# Patient Record
Sex: MALE | Race: WHITE | NOT HISPANIC OR LATINO | Employment: FULL TIME | URBAN - METROPOLITAN AREA
[De-identification: names, ages, dates, MRNs, and addresses within clinical notes are randomized per-mention and may not be internally consistent; named-entity substitution may affect disease eponyms.]

---

## 2023-10-03 PROBLEM — M54.16 LEFT LUMBAR RADICULITIS: Status: ACTIVE | Noted: 2023-10-03

## 2023-10-03 PROBLEM — M25.512 CHRONIC LEFT SHOULDER PAIN: Status: ACTIVE | Noted: 2023-10-03

## 2023-10-03 PROBLEM — R79.89 LOW TESTOSTERONE IN MALE: Status: ACTIVE | Noted: 2023-10-03

## 2023-10-03 PROBLEM — Z96.641 STATUS POST TOTAL REPLACEMENT OF RIGHT HIP: Status: ACTIVE | Noted: 2023-10-03

## 2023-10-03 PROBLEM — D22.9: Status: ACTIVE | Noted: 2023-10-03

## 2023-10-03 PROBLEM — G89.29 CHRONIC LEFT SHOULDER PAIN: Status: ACTIVE | Noted: 2023-10-03

## 2023-10-03 RX ORDER — CHOLECALCIFEROL (VITAMIN D3) 125 MCG
125 CAPSULE ORAL
COMMUNITY
End: 2024-02-20 | Stop reason: WASHOUT

## 2023-10-05 ENCOUNTER — OFFICE VISIT (OUTPATIENT)
Dept: PAIN MEDICINE | Facility: HOSPITAL | Age: 38
End: 2023-10-05
Payer: COMMERCIAL

## 2023-10-05 VITALS — HEIGHT: 78 IN | BODY MASS INDEX: 25.11 KG/M2 | WEIGHT: 217 LBS

## 2023-10-05 DIAGNOSIS — M54.16 LUMBAR RADICULITIS: Primary | ICD-10-CM

## 2023-10-05 PROCEDURE — 99204 OFFICE O/P NEW MOD 45 MIN: CPT | Performed by: ANESTHESIOLOGY

## 2023-10-05 PROCEDURE — 99214 OFFICE O/P EST MOD 30 MIN: CPT | Performed by: ANESTHESIOLOGY

## 2023-10-05 RX ORDER — GABAPENTIN 300 MG/1
CAPSULE ORAL
Qty: 180 CAPSULE | Refills: 0 | Status: SHIPPED | OUTPATIENT
Start: 2023-10-05 | End: 2024-02-20 | Stop reason: WASHOUT

## 2023-10-05 ASSESSMENT — PAIN SCALES - GENERAL: PAINLEVEL: 10-WORST PAIN EVER

## 2023-10-05 NOTE — H&P
History Of Present Illness  Diaz Velasco is a 38 y.o. male who presents as a new patient in our clinic today. Patient has a history of avascular necrosis and required right hip arthroplasty in 2020. Patient presents complaining of no pain but significant numbness and tingling in the posterior aspect of his buttock going all the way down to his feet. He exercises avidly and notes he felt the symptoms after squatting heavy. He says the symptoms are exacerbated with prolonged standing walking and is relieved with sitting. He denies any bowel/bladder incontinence or saddle anesthesia. Patient does not take any neuromodulating medications. He underwent an x-ray of the lumbar spine earlier in September which did not show any significant findings; no fractures or spondylolisthesis, or degenerative disc changes. Patient has completed a full course of formal physical therapy and continues the stretches and exercises at home 3-4 times a week over the past 1 year.     Past Medical History  He has a past medical history of Idiopathic aseptic necrosis of right femur (CMS/[facility]) (09/23/2020) and Personal history of other mental and behavioral disorders.    Surgical History  He has a past surgical history that includes Other surgical history (08/17/2016).     Social History  He has no history on file for tobacco use, alcohol use, and drug use.    Family History  Family History   Problem Relation Name Age of Onset    Skin cancer Mother      Diabetes Father      Other (Bladder Cancer) Father      Multiple sclerosis Brother          Allergies  Patient has no known allergies.    Review of Systems   All other systems reviewed and are negative.  13 point review of systems is completed and is negative unless stated above in HPI.     Physical Exam  PHYSICAL EXAM  Vitals signs reviewed  Constitutional:       General: Not in acute distress     Appearance: Normal appearance. Not ill-appearing.  HENT:     Head: Normocephalic and  "atraumatic  Eyes:     Conjunctiva/sclera: Conjunctivae normal  Cardiovascular:     Rate and Rhythm: Normal rate and regular rhythm  Pulmonary:     Effort: No respiratory distress  Abdominal:     Palpations: Abdomen is soft  Musculoskeletal: KAMINSKI. Ambulates normally on heels and toes. Straight leg test positive on the right side.  Skin:     General: Skin is warm and dry  Neurological:     General: No focal deficit present  Psychiatric:         Mood and Affect: Mood normal         Behavior: Behavior normal    Last Recorded Vitals  Height 1.981 m (6' 6\"), weight 98.4 kg (217 lb).    Relevant Results  XR lumbar spine complete 4+ views    Result Date: 9/19/2023  Interpreted By:  FERMIN VELA MD MRN: [id number] Patient Name: NEVAEH VELASCO  STUDY: Lumbar Spine, 4 views.  INDICATION: pain  M54.16: Left lumbar radiculitis.  COMPARISON: None.  ACCESSION NUMBER(S): [id number]  ORDERING CLINICIAN: DEXTER CASH  FINDINGS: Alignment is within normal limits. Disc heights are well preserved. Mild facet joint arthropathy at L5-S1. No dynamic instability on flexion/extension views. Vertebral body heights are preserved. Posterior elements are intact.      1. Mild facet joint arthropathy at L5-S1. Otherwise, unremarkable lumbar spine radiographs.              Assessment/Plan   Nevaeh Velasco is a 38-year-old male presenting as new patient in clinic today. Patient has a history of avascular necrosis of the right hip status post right hip replacement in 2020. Patient comes in complaining of numbness and tingling along the right buttock and posterior right lower extremity down to his toes. He attributes this to heavy lifting as he is also a . He says the symptoms are worse with prolonged standing and walking, and is relieved with sitting. He does not take any neurologic medications. He has completed a course of physical therapy and continues to stretches/exercises at home. X-ray of the lumbar spine done in September shows " mild degenerative disc changes at L5-S1, otherwise no significant findings. We discussed with the patient that he is most likely suffering from lumbar spinal stenosis. The mainstay of treatment is a combination of physical therapy, medications, as well as injections. We discussed all of these remedies with the patient and he is wishing to proceed.    Plan:  1. L5-S1 interlaminar epidural injection under fluoroscopy. Risks and benefits of the procedure was discussed with the patient and he wishes to proceed.  2. Gabapentin up titration from 300 mg nightly to be increased 600mg three times daily.  Goals of therapy, the dosages and side effects of the medication were discussed in detail with the patient.  The patient understands and agrees to take the medication as prescribed.  We discussed that if we discontinue this in the future, it would need to be weaned down rather than stopped abruptly.  Instructions and a hardcopy form were given to the patient and explained.  3. We discussed continuing physical therapy to help manage the patient's painful condition.  The patient was counseled that core muscle strengthening will improve the long term prognosis in regards to pain and may also increase range of motion and mobility.  The patient's questions were answered and she was agreeable to this course.                 Rome Nascimento MD

## 2023-10-17 DIAGNOSIS — M54.16 LUMBAR RADICULOPATHY: ICD-10-CM

## 2023-10-23 ENCOUNTER — HOSPITAL ENCOUNTER (OUTPATIENT)
Dept: RADIOLOGY | Facility: HOSPITAL | Age: 38
Discharge: HOME | End: 2023-10-23
Payer: COMMERCIAL

## 2023-10-23 VITALS
SYSTOLIC BLOOD PRESSURE: 153 MMHG | TEMPERATURE: 98.2 F | BODY MASS INDEX: 25.45 KG/M2 | DIASTOLIC BLOOD PRESSURE: 74 MMHG | RESPIRATION RATE: 16 BRPM | WEIGHT: 220 LBS | HEART RATE: 84 BPM | OXYGEN SATURATION: 96 % | HEIGHT: 78 IN

## 2023-10-23 DIAGNOSIS — M54.16 LUMBAR RADICULITIS: ICD-10-CM

## 2023-10-23 DIAGNOSIS — M54.16 LUMBAR RADICULOPATHY: ICD-10-CM

## 2023-10-23 PROCEDURE — 62323 NJX INTERLAMINAR LMBR/SAC: CPT | Performed by: ANESTHESIOLOGY

## 2023-10-23 PROCEDURE — 77003 FLUOROGUIDE FOR SPINE INJECT: CPT

## 2023-10-23 RX ORDER — DEXAMETHASONE SODIUM PHOSPHATE 4 MG/ML
10 INJECTION, SOLUTION INTRA-ARTICULAR; INTRALESIONAL; INTRAMUSCULAR; INTRAVENOUS; SOFT TISSUE AS NEEDED
Status: DISCONTINUED | OUTPATIENT
Start: 2023-10-23 | End: 2023-10-23

## 2023-10-23 RX ORDER — LIDOCAINE HYDROCHLORIDE 5 MG/ML
10 INJECTION, SOLUTION INFILTRATION; PERINEURAL ONCE
Status: DISCONTINUED | OUTPATIENT
Start: 2023-10-23 | End: 2023-10-24 | Stop reason: HOSPADM

## 2023-10-23 RX ORDER — MIDAZOLAM HYDROCHLORIDE 1 MG/ML
2 INJECTION INTRAMUSCULAR; INTRAVENOUS AS NEEDED
Status: DISCONTINUED | OUTPATIENT
Start: 2023-10-23 | End: 2023-10-24 | Stop reason: HOSPADM

## 2023-10-23 RX ORDER — FENTANYL CITRATE 50 UG/ML
50 INJECTION, SOLUTION INTRAMUSCULAR; INTRAVENOUS ONCE
Status: DISCONTINUED | OUTPATIENT
Start: 2023-10-23 | End: 2023-10-24 | Stop reason: HOSPADM

## 2023-10-23 RX ORDER — DEXAMETHASONE SODIUM PHOSPHATE 10 MG/ML
10 INJECTION INTRAMUSCULAR; INTRAVENOUS ONCE AS NEEDED
Status: DISCONTINUED | OUTPATIENT
Start: 2023-10-23 | End: 2023-10-24 | Stop reason: HOSPADM

## 2023-10-23 RX ORDER — ROPIVACAINE HYDROCHLORIDE 5 MG/ML
10 INJECTION, SOLUTION EPIDURAL; INFILTRATION; PERINEURAL ONCE
Status: DISCONTINUED | OUTPATIENT
Start: 2023-10-23 | End: 2023-10-24 | Stop reason: HOSPADM

## 2023-10-23 RX ORDER — MIDAZOLAM HYDROCHLORIDE 1 MG/ML
2 INJECTION INTRAMUSCULAR; INTRAVENOUS ONCE
Status: DISCONTINUED | OUTPATIENT
Start: 2023-10-23 | End: 2023-10-24 | Stop reason: HOSPADM

## 2023-10-23 RX ORDER — FENTANYL CITRATE 50 UG/ML
25 INJECTION, SOLUTION INTRAMUSCULAR; INTRAVENOUS ONCE
Status: DISCONTINUED | OUTPATIENT
Start: 2023-10-23 | End: 2023-10-23

## 2023-10-23 RX ORDER — BUPIVACAINE HYDROCHLORIDE 2.5 MG/ML
10 INJECTION, SOLUTION INFILTRATION; PERINEURAL ONCE
Status: DISCONTINUED | OUTPATIENT
Start: 2023-10-23 | End: 2023-10-24 | Stop reason: HOSPADM

## 2023-10-23 RX ORDER — LIDOCAINE HYDROCHLORIDE 20 MG/ML
10 INJECTION, SOLUTION EPIDURAL; INFILTRATION; INTRACAUDAL; PERINEURAL AS NEEDED
Status: DISCONTINUED | OUTPATIENT
Start: 2023-10-23 | End: 2023-10-24 | Stop reason: HOSPADM

## 2023-10-23 ASSESSMENT — PAIN SCALES - GENERAL
PAINLEVEL_OUTOF10: 5 - MODERATE PAIN
PAINLEVEL_OUTOF10: 0 - NO PAIN
PAINLEVEL_OUTOF10: 0 - NO PAIN

## 2023-10-23 ASSESSMENT — PAIN - FUNCTIONAL ASSESSMENT
PAIN_FUNCTIONAL_ASSESSMENT: 0-10
PAIN_FUNCTIONAL_ASSESSMENT: WONG-BAKER FACES
PAIN_FUNCTIONAL_ASSESSMENT: 0-10

## 2023-10-23 NOTE — H&P
History Of Present Illness  Diaz Velasco is a 38 y.o. male presents for procedure state below. Endorses no changes in past medical history or medical health since last seen in clinic.     Past Medical History  He has a past medical history of Idiopathic aseptic necrosis of right femur (CMS/HCC) (09/23/2020) and Personal history of other mental and behavioral disorders.    Surgical History  He has a past surgical history that includes Other surgical history (08/17/2016).     Social History  He has no history on file for tobacco use, alcohol use, and drug use.    Family History  Family History   Problem Relation Name Age of Onset    Skin cancer Mother      Diabetes Father      Other (Bladder Cancer) Father      Multiple sclerosis Brother          Allergies  Patient has no known allergies.    Review of Symptoms:   Constitutional: Negative for chills, diaphoresis or fever  HENT: Negative for neck swelling  Eyes:.  Negative for eye pain  Respiratory:.  Negative for cough, shortness of breath or wheezing    Cardiovascular:.  Negative for chest pain or palpitations  Gastrointestinal:.  Negative for abdominal pain, nausea and vomiting  Genitourinary:.  Negative for urgency  Musculoskeletal:  Positive for back pain. Positive for joint pain. Denies falls within the past 3 months.  Skin: Negative for wounds or itching   Neurological: Negative for dizziness, seizures, loss of consciousness and weakness  Endo/Heme/Allergies: Does not bruise/bleed easily  Psychiatric/Behavioral: Negative for depression. The patient does not appear anxious.       PHYSICAL EXAM  Vitals signs reviewed  Constitutional:       General: Not in acute distress     Appearance: Normal appearance. Not ill-appearing.  HENT:     Head: Normocephalic and atraumatic  Eyes:     Conjunctiva/sclera: Conjunctivae normal  Cardiovascular:     Rate and Rhythm: Normal rate and regular rhythm  Pulmonary:     Effort: No respiratory distress  Abdominal:     Palpations:  Abdomen is soft  Musculoskeletal: KAMINSKI  Skin:     General: Skin is warm and dry  Neurological:     General: No focal deficit present  Psychiatric:         Mood and Affect: Mood normal         Behavior: Behavior normal     Last Recorded Vitals  There were no vitals taken for this visit.    Relevant Results  Current Outpatient Medications   Medication Instructions    cholecalciferol (VITAMIN D-3) 125 mcg, oral    gabapentin (Neurontin) 300 mg capsule Day 1 Take 300mg at bedtime, Day 2 300mg BID, Day 3 300mg TID, Day 4-6 300mg AM 300mg afternoon 600mg at bedtime, Day 7-9 300mg Am, 600mg afternoon, 600mg at bedtime, Day 10 and after 600mg TID.    zinc sulfate 25 mg zinc (110 mg) tablet oral       No results found for this or any previous visit from the past 1000 days.     No image results found.       1. Lumbar radiculitis  Epidural Steroid Injection    Epidural Steroid Injection      2. Lumbar radiculopathy  FL pain management TC    FL pain management TC           ASSESSMENT/PLAN  Diaz Velasco is a 38 y.o. male presents for L5-S1 Interlaminar Epidural Steroid Injection    Our plan is as follows:  - Follow In pain clinic  - Continue to participate in physical therapy as well as physician directed home exercises  - Continue pain medications as prescribed       Yefri Patrick MD

## 2023-10-23 NOTE — PROCEDURES
Preoperative diagnosis:  Lumbar radiculitis  Postoperative diagnosis:  Lumbar radiculitis  Procedure: Left biased L5-S1 lumbar epidural steroid injection under fluoroscopic guidance  Surgeon: Wendy Graham  Assistant:  Fellow, Yefri Patrick  Anesthesia: Local  Complications: Apparently none    Clinical note: Mr. Velasco is a 38-year-old male with a history of low back and left leg pain and known disc base narrowing and degenerative changes at L5-S1.  The patient is here today for the aforementioned procedure.    Procedure note: The patient was met in the preoperative holding area after risks benefits and alternatives to procedure were discussed with the patient, informed consent was obtained. Patient brought back to the procedure room and placed in the prone position on the fluoroscopy table. Area over the back was exposed, prepped, draped, in the usual sterile fashion.  Skin and subcutaneous tissues to the lumbar intralaminar space was anesthetized using 0.5% lidocaine.  An 18-gauge Tuohy needle was inserted in the skin and advanced into the interlaminar space. A glass syringe was used to achieve the epidural space using the loss resistance technique. Contrast was injected which showed appropriate epidural spread, no intravascular or intrathecal uptake. A total of 4 mL's of 0.5% lidocaine mixed with 40 mg methylprednisolone was injected. Needle removed, bandage applied, patient tolerated the procedure well with no immediate complications.

## 2023-11-29 ENCOUNTER — EVALUATION (OUTPATIENT)
Dept: PHYSICAL THERAPY | Facility: CLINIC | Age: 38
End: 2023-11-29
Payer: COMMERCIAL

## 2023-11-29 DIAGNOSIS — M54.16 LUMBAR RADICULITIS: Primary | ICD-10-CM

## 2023-11-29 PROCEDURE — 97110 THERAPEUTIC EXERCISES: CPT | Mod: GP | Performed by: PHYSICAL THERAPIST

## 2023-11-29 PROCEDURE — 97161 PT EVAL LOW COMPLEX 20 MIN: CPT | Mod: GP | Performed by: PHYSICAL THERAPIST

## 2023-11-29 ASSESSMENT — ENCOUNTER SYMPTOMS
DEPRESSION: 0
OCCASIONAL FEELINGS OF UNSTEADINESS: 0
LOSS OF SENSATION IN FEET: 0

## 2023-11-29 NOTE — PROGRESS NOTES
Physical Therapy Evaluation    Patient Name: Diaz Vleasco  MRN: [id number]  Today's Date: 11/29/2023  Visit: 1/MN  Referred by: Dr. Graham  Diagnosis:   1. Lumbar radiculitis          PRECAUTIONS:   (R) THA 2020    SUBJECTIVE:  Patient is a well-known patient to this clinic as he has been treated   Took a bike ride in August with child and had severe (L) leg pain. Leg pain has improved but (L) foot numbness still persists. 6-8 weeks of pain prior to resolution.  Felt like I was standing on a ball but this has improved with the stretching.  Had injection by pain management 10/23/2023 with minor improvement.  Pain:  0/10 pain, 9/10 numbness LLE.  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   Prior level of function:  Prior history of lumbosacral radiculopathy in the LLE - resolved with therapy    OBJECTIVE:  Lumbar AROM: (% movement)   Flexion 100%   Extension 100%   Right Sidebend 100%   Left Sidebend 100%   Right Rotation 100%   Left Rotation 100%     Lumbar Repetitive Movement Response   Flexion in standing Mild pain low back, worse numbness LLE   Extension in standing No change in numbness, No LBP   Right Sideglide No change in numbness, No LBP   Extension in prone No change in numbness, No LBP, mild improvement in numbness with Pas to L5/S1     Lumbar Sustained Movement Response   Extension in standing Mild improvement in LLE numbness   Extension in prone Mild improvement in numbness.     Core Strength: Sahrmann 4+ MMT  Palpation: No pain to palpation. LLE decreased sensation to light touch in L5/S1 distribution, specifically at lateral foot/ankle.  Special Testing: +Slump LLE and +SLR LLE  Dermatomal Impairment: decreased sensation to light touch in L5/S1 distribution, specifically at lateral foot/ankle.  Myotomal Impairment:   PF: RLE: 5/5, LLE 4+/5   Ankle inversion: RLE: 5/5, LLE 4+/5    ASSESSMENT:  Patient is a 38 year old male who presents to physical therapy on this date for  evaluation and treatment of his LLE radicular symptoms. Examination on this date suggests discal etiology with favorable symptom response with extension protocol. Skilled therapy will target utilization of Santino principles to resolve pain and allow for return to prior level of function. Overpressure during extension will be necessary to address symptoms.    TREATMENT:  - Therex:  Prone press ups 3x10 - PA to L5  Standing lumbar extensions with belt providing PA to L5 3x10  Prone prop with PA to L5  Standing sideglide hips to the R 3x10    PATIENT EDUCATION:  Outpatient Education  Individual(s) Educated: Patient  Education Provided: Anatomy, Home Exercise Program, POC, Physiology  Patient/Caregiver Demonstrated Understanding: yes  Plan of Care Discussed and Agreed Upon: yes    PLAN:   Treatment/Interventions: Manual therapy, Mechanical traction, Therapeutic exercises  PT Plan: Skilled PT  PT Frequency: 1 time per week  Duration: 6 weeks  Rehab Potential: Fair  Plan of Care Agreement: Patient    GOALS:  Active       PT Problem       Patient will report full return of sensation to the LLE.       Start:  11/29/23    Expected End:  01/24/24            Patient will achieve left ankle plantar flexion strength of 5/5       Start:  11/29/23    Expected End:  01/24/24            Patient will achieve left ankle eversion strength of 5/5       Start:  11/29/23    Expected End:  01/24/24            Patient will demonstrate independence in home program for support of progression       Start:  11/29/23    Expected End:  01/24/24

## 2023-12-06 ENCOUNTER — TREATMENT (OUTPATIENT)
Dept: PHYSICAL THERAPY | Facility: CLINIC | Age: 38
End: 2023-12-06
Payer: COMMERCIAL

## 2023-12-06 DIAGNOSIS — M54.16 LUMBAR RADICULITIS: ICD-10-CM

## 2023-12-06 PROCEDURE — 97110 THERAPEUTIC EXERCISES: CPT | Mod: GP | Performed by: PHYSICAL THERAPIST

## 2023-12-06 NOTE — PROGRESS NOTES
"Physical Therapy Treatment    Patient Name: iDaz Velasco  MRN: [id number]  Today's Date: 12/6/2023   Visit 2/MN  1. Lumbar radiculitis  Follow Up In Physical Therapy          PRECAUTIONS:      SUBJECTIVE:  Patient states that his symptoms seem unchanged. Still not having pain but numbness in the   Pain level: 0/10  Compliant with HEP? Yes    OBJECTIVE:  Mild improvement in numbness with treatment    TREATMENT:  - Therex:  Upright bike L2x6  HS stretching on steps 20\"x5 each  Hip flexor stretching in half kneeling 20\"x5 each  Prone press ups 3x10  Prone press up hip to the (R) 3x10  (L) sidelying flex/rot over bolster x5 mins  Standing lumbar extensions with hips going anterior/(R) 3x10  Standing sideglides with to the (R) 3x10    ASSESSMENT:  Patient tolerated treatment well. Ongoing numbness in the LLE despite exercise. Ongoing treatment may be successful in fully resolving numbness but if radicular numbness does not change, MRI may be needed.    EDUCATION:    PLAN:   Continue with movement based treatment. Trial traction next visit.         "

## 2023-12-13 ENCOUNTER — TREATMENT (OUTPATIENT)
Dept: PHYSICAL THERAPY | Facility: CLINIC | Age: 38
End: 2023-12-13
Payer: COMMERCIAL

## 2023-12-13 DIAGNOSIS — M54.16 LUMBAR RADICULITIS: ICD-10-CM

## 2023-12-13 PROCEDURE — 97110 THERAPEUTIC EXERCISES: CPT | Mod: GP | Performed by: PHYSICAL THERAPIST

## 2023-12-13 PROCEDURE — 97012 MECHANICAL TRACTION THERAPY: CPT | Mod: GP | Performed by: PHYSICAL THERAPIST

## 2023-12-13 NOTE — PROGRESS NOTES
"Physical Therapy Treatment    Patient Name: Diaz Velasco  MRN: [id number]  Today's Date: 12/13/2023   Visit 3/MN  1. Lumbar radiculitis  Follow Up In Physical Therapy        PRECAUTIONS:  None    SUBJECTIVE:  Patient reports ongoing symptoms of numbness in the LLE. Does feel like the numbness seems more isolated to the pinky toe as opposed to the lateral half of his left foot.   Pain level: 0/10  Compliant with HEP? Yes    OBJECTIVE:  Mild improvement in numbness with treatment    TREATMENT:  - Therex:  Upright bike L2x6  HS stretching on steps 20\"x5 each  Hip flexor stretching in half kneeling 20\"x5 each  Prone press ups 3x10  Prone press up hip to the (R) 3x10  Standing lumbar extensions 3x10  Standing sideglides with to the (R) 3x10    Not performed today:  (L) sidelying flex/rot over bolster x5 mins  Standing lumbar extensions with hips going anterior/(R) 3x10    -Modalities:  Intermittent lumbar traction x15 mins set to 80#/45#, 60\"/15\"    ASSESSMENT:  Patient tolerated treatment well. Traction did not worsen any symptoms and may be beneficial in fully relieving radicular numbness in the LLE as numbness in the 5th digit in the LLE migrated closer to the tip of the toe.    EDUCATION:    PLAN:   Continue with traction for the next 2 sessions to determine cumulative effect of decompression.       "

## 2023-12-22 ENCOUNTER — TREATMENT (OUTPATIENT)
Dept: PHYSICAL THERAPY | Facility: CLINIC | Age: 38
End: 2023-12-22
Payer: COMMERCIAL

## 2023-12-22 DIAGNOSIS — M54.16 LUMBAR RADICULITIS: Primary | ICD-10-CM

## 2023-12-22 PROCEDURE — 97110 THERAPEUTIC EXERCISES: CPT | Mod: GP | Performed by: PHYSICAL THERAPIST

## 2023-12-22 PROCEDURE — 97012 MECHANICAL TRACTION THERAPY: CPT | Mod: GP | Performed by: PHYSICAL THERAPIST

## 2023-12-22 NOTE — PROGRESS NOTES
"Physical Therapy Treatment    Patient Name: Diaz Velasco  MRN: [id number]  Today's Date: 12/22/2023   Visit 3/MN  1. Lumbar radiculitis            PRECAUTIONS:  None    SUBJECTIVE:  Patient states that (L) little toe numbness is now focused more along the middle 2 toes of the (L) foot. Knows that the numbness is moving and feels better about it.  Pain level: 0/10  Compliant with HEP? Yes    OBJECTIVE:  Mild improvement in numbness with treatment    TREATMENT:  - Therex:  Prone press ups 3x10  Prone press up hip to the (R) 3x10  Standing lumbar extensions 3x10  Standing sideglides with to the (R) 3x10    Not performed today:  (L) sidelying flex/rot over bolster x5 mins  Standing lumbar extensions with hips going anterior/(R) 3x10    -Modalities:  Intermittent lumbar traction x15 mins set to 80#/45#, 60\"/15\"    ASSESSMENT:  Patient continues to exhibit radicular numbness changes with traction and sustained/repeated movements of the lumbar spine indicating discal etiology. Continued decompression may be necessary for full resolution of pain.    EDUCATION:    PLAN:   Continue with traction and repeated motions in hopes to fully resolve LLE numbness       "

## 2023-12-27 ENCOUNTER — TREATMENT (OUTPATIENT)
Dept: PHYSICAL THERAPY | Facility: CLINIC | Age: 38
End: 2023-12-27
Payer: COMMERCIAL

## 2023-12-27 DIAGNOSIS — M54.16 LUMBAR RADICULITIS: ICD-10-CM

## 2023-12-27 PROCEDURE — 97110 THERAPEUTIC EXERCISES: CPT | Mod: GP | Performed by: PHYSICAL THERAPIST

## 2023-12-27 PROCEDURE — 97012 MECHANICAL TRACTION THERAPY: CPT | Mod: GP | Performed by: PHYSICAL THERAPIST

## 2023-12-27 NOTE — PROGRESS NOTES
"Physical Therapy Treatment    Patient Name: Diaz Velasco  MRN: [id number]  Today's Date: 12/27/2023   Visit 4/MN  1. Lumbar radiculitis  Follow Up In Physical Therapy          PRECAUTIONS:  None    SUBJECTIVE:  Patient reports that his numbness is about the same as last visit - focused around the two middle toes on the (L) foot.  Pain level: 0/10  Compliant with HEP? Yes    OBJECTIVE:  No change in symptoms  No pain/soreness with side glides.    TREATMENT:  - Therex:  Prone press ups 3x10  Prone press up hip to the (R) 3x10  Standing lumbar extensions 3x10  Standing sideglides with to the (R) 3x10    Not performed today:  (L) sidelying flex/rot over bolster x5 mins  Standing lumbar extensions with hips going anterior/(R) 3x10    -Modalities:  Intermittent lumbar traction x15 mins set to 80#/45#, 60\"/15\"    ASSESSMENT:  Patient exhibiting sensation change which suggests decompression of the lumbar spine is allowing for reduced neural compression. Ongoing therapy would allow for further decompression.    EDUCATION:    PLAN:   Pursue MRI if symptom change plateaus.       "

## 2024-01-09 ENCOUNTER — TREATMENT (OUTPATIENT)
Dept: PHYSICAL THERAPY | Facility: CLINIC | Age: 39
End: 2024-01-09
Payer: COMMERCIAL

## 2024-01-09 DIAGNOSIS — M54.16 LUMBAR RADICULITIS: Primary | ICD-10-CM

## 2024-01-09 PROCEDURE — 97110 THERAPEUTIC EXERCISES: CPT | Mod: GP | Performed by: PHYSICAL THERAPIST

## 2024-01-09 PROCEDURE — 97012 MECHANICAL TRACTION THERAPY: CPT | Mod: GP | Performed by: PHYSICAL THERAPIST

## 2024-01-09 NOTE — PROGRESS NOTES
"Physical Therapy Treatment    Patient Name: Diaz Velasco  MRN: [id number]  Today's Date: 1/9/2024   Visit 5/MN  1. Lumbar radiculitis          PRECAUTIONS:  None    SUBJECTIVE:  Patient states that the numbness is the (L) foot seems to be mostly centered around the \"ring\" toe. More of the foot will go numb if he sits for a long period of time but for the most part, the numbness is fairly consistent.  Pain level: 0/10  Compliant with HEP? Yes    OBJECTIVE:  No change in symptoms  No pain/soreness with side glides.    TREATMENT:  - Therex:  Standing lumbar extensions with belt 3x10  Standing lumbar extensions 3x10  Standing sideglides with to the (R) 3x10    Not performed today:  (L) sidelying flex/rot over bolster x5 mins  Standing lumbar extensions with hips going anterior/(R) 3x10  Prone press ups 3x10  Prone press up hip to the (R) 3x10    -Modalities:  Intermittent lumbar traction x15 mins set to 80#/45#, 60\"/15\"    ASSESSMENT:  Patient with ongoing sensory deficit to the (L) foot which indicates ongoing neural compromise in the lumbar spine. Ongoing therapy may not allow for any additional relief and thus, return to MD is likely necessary.    EDUCATION:    PLAN:   Pursue MRI and return to referring physician to possible undergo an additional injection.       "

## 2024-01-15 ENCOUNTER — APPOINTMENT (OUTPATIENT)
Dept: PRIMARY CARE | Facility: CLINIC | Age: 39
End: 2024-01-15
Payer: COMMERCIAL

## 2024-02-13 ENCOUNTER — OFFICE VISIT (OUTPATIENT)
Dept: ORTHOPEDIC SURGERY | Facility: CLINIC | Age: 39
End: 2024-02-13
Payer: COMMERCIAL

## 2024-02-13 ENCOUNTER — HOSPITAL ENCOUNTER (OUTPATIENT)
Dept: RADIOLOGY | Facility: CLINIC | Age: 39
Discharge: HOME | End: 2024-02-13
Payer: COMMERCIAL

## 2024-02-13 DIAGNOSIS — M54.16 LUMBAR RADICULOPATHY: ICD-10-CM

## 2024-02-13 DIAGNOSIS — M54.50 LOW BACK PAIN, UNSPECIFIED BACK PAIN LATERALITY, UNSPECIFIED CHRONICITY, UNSPECIFIED WHETHER SCIATICA PRESENT: ICD-10-CM

## 2024-02-13 PROCEDURE — 72110 X-RAY EXAM L-2 SPINE 4/>VWS: CPT | Performed by: STUDENT IN AN ORGANIZED HEALTH CARE EDUCATION/TRAINING PROGRAM

## 2024-02-13 PROCEDURE — 1036F TOBACCO NON-USER: CPT | Performed by: PHYSICIAN ASSISTANT

## 2024-02-13 PROCEDURE — 72110 X-RAY EXAM L-2 SPINE 4/>VWS: CPT

## 2024-02-13 PROCEDURE — 99213 OFFICE O/P EST LOW 20 MIN: CPT | Performed by: PHYSICIAN ASSISTANT

## 2024-02-13 PROCEDURE — 99213 OFFICE O/P EST LOW 20 MIN: CPT | Mod: 25,27 | Performed by: PHYSICIAN ASSISTANT

## 2024-02-13 ASSESSMENT — PAIN DESCRIPTION - DESCRIPTORS: DESCRIPTORS: ACHING;NUMBNESS;TIGHTNESS

## 2024-02-13 ASSESSMENT — PAIN - FUNCTIONAL ASSESSMENT: PAIN_FUNCTIONAL_ASSESSMENT: 0-10

## 2024-02-13 ASSESSMENT — PAIN SCALES - GENERAL: PAINLEVEL_OUTOF10: 4

## 2024-02-13 NOTE — PROGRESS NOTES
"HPI:  Diaz Velasco is a 38 y.o. male who presents to the spine clinic for follow up of LLE radiculopathy. Last OV 09/18/24.     Patient completed outpatient PT and lumbar KATHIE. Reports severe leg pain improved however continues to have low back \"tightness\" and numbness along the outside of the left foot into the 4th & 5th toes. No focal weakness. No bowel or bladder discomfort.     At this point he would like to obtain and MRI scan as the numbness is quite bothersome.    ROS:  Reviewed on EMR and patient intake sheet.    PMH/SH:  Reviewed on EMR and patient intake sheet.    Exam:  Physical Exam  Spine Musculoskeletal Exam    Well appearing, NAD  Pleasant & cooperative  normal ROM lumbar spine with rotation, flexion/extension  no paraspinal muscle spasms  Mild midline ttp lumbar spine  Decreased sensation S1   lower extremity motor 5/5 throughout  normal gait & station  + L SLR    Radiology:     Rays of the lumbar spine done in the office today 9/18/23 reviewed by myself. Good alignment without scoliosis, listhesis, or fractue. Mild loss of disc height L5-S1.     Assessment and Plan:  1. Lumbar radiculopathy  - MR lumbar spine wo IV contrast; Future    Patient with LLE radiculopathy who has failed conservative treatment with PT and KATHIE. Recommend MRI lumbar spine and follow up to discuss next steps / potential surgical intervention.    Patient in agreement to plan of care. Of course Diaz Velasco is welcome to call at anytime with questions or concerns.     Leia Ortez PA-C  Department of Orthopaedic Surgery    [address] [place] [address]    Voicemail: [phone]   Appts: [phone]  Fax: [phone]      "
headache

## 2024-02-20 ENCOUNTER — OFFICE VISIT (OUTPATIENT)
Dept: PRIMARY CARE | Facility: CLINIC | Age: 39
End: 2024-02-20
Payer: COMMERCIAL

## 2024-02-20 ENCOUNTER — LAB (OUTPATIENT)
Dept: LAB | Facility: LAB | Age: 39
End: 2024-02-20
Payer: COMMERCIAL

## 2024-02-20 VITALS
DIASTOLIC BLOOD PRESSURE: 86 MMHG | OXYGEN SATURATION: 96 % | HEART RATE: 86 BPM | WEIGHT: 236 LBS | RESPIRATION RATE: 12 BRPM | BODY MASS INDEX: 27.31 KG/M2 | SYSTOLIC BLOOD PRESSURE: 122 MMHG | HEIGHT: 78 IN

## 2024-02-20 DIAGNOSIS — R79.89 LOW TESTOSTERONE IN MALE: ICD-10-CM

## 2024-02-20 DIAGNOSIS — Z00.00 ROUTINE GENERAL MEDICAL EXAMINATION AT A HEALTH CARE FACILITY: Primary | ICD-10-CM

## 2024-02-20 DIAGNOSIS — Z00.00 ROUTINE GENERAL MEDICAL EXAMINATION AT A HEALTH CARE FACILITY: ICD-10-CM

## 2024-02-20 PROCEDURE — 86803 HEPATITIS C AB TEST: CPT

## 2024-02-20 PROCEDURE — 90471 IMMUNIZATION ADMIN: CPT | Performed by: FAMILY MEDICINE

## 2024-02-20 PROCEDURE — 80061 LIPID PANEL: CPT

## 2024-02-20 PROCEDURE — 90707 MMR VACCINE SC: CPT | Performed by: FAMILY MEDICINE

## 2024-02-20 PROCEDURE — 36415 COLL VENOUS BLD VENIPUNCTURE: CPT

## 2024-02-20 PROCEDURE — 80053 COMPREHEN METABOLIC PANEL: CPT

## 2024-02-20 PROCEDURE — 1036F TOBACCO NON-USER: CPT | Performed by: FAMILY MEDICINE

## 2024-02-20 PROCEDURE — 99395 PREV VISIT EST AGE 18-39: CPT | Performed by: FAMILY MEDICINE

## 2024-02-20 PROCEDURE — 84153 ASSAY OF PSA TOTAL: CPT

## 2024-02-20 PROCEDURE — 85027 COMPLETE CBC AUTOMATED: CPT

## 2024-02-20 PROCEDURE — 81001 URINALYSIS AUTO W/SCOPE: CPT

## 2024-02-20 ASSESSMENT — ENCOUNTER SYMPTOMS
DIZZINESS: 0
EYE PAIN: 0
COUGH: 0
BACK PAIN: 0
ABDOMINAL DISTENTION: 0
BRUISES/BLEEDS EASILY: 0
ARTHRALGIAS: 0
NERVOUS/ANXIOUS: 0
DYSPHORIC MOOD: 0
CHEST TIGHTNESS: 0
EYE REDNESS: 0
APPETITE CHANGE: 0
BLOOD IN STOOL: 0
HEADACHES: 0
DIFFICULTY URINATING: 0
ADENOPATHY: 0
CONSTIPATION: 0
SHORTNESS OF BREATH: 0
CHILLS: 0
FATIGUE: 0
DIARRHEA: 0
WEAKNESS: 0
DYSURIA: 0
SORE THROAT: 0
ABDOMINAL PAIN: 0
FEVER: 0

## 2024-02-20 NOTE — PROGRESS NOTES
"Subjective   Patient ID: Diaz Velasco is a 38 y.o. male who presents for Annual Exam.    HPI     Review of Systems    Objective   /86   Pulse 86   Resp 12   Ht 1.981 m (6' 6\")   Wt 107 kg (236 lb)   SpO2 96%   BMI 27.27 kg/m²     Physical Exam    Assessment/Plan          "

## 2024-02-20 NOTE — PROGRESS NOTES
"Subjective   Patient ID: Diaz Velasco is a 38 y.o. male who presents for Annual Exam.  PMHX, PSHx, Fam hx, and Social hx reviewed.   New concerns - doing well on T replacement for appx 1yr. Last leve was in 800s, initially in 100s.  Vaccines MMR shot today, other vaccines current.  Dentist seen at least yearly damaris  Vision concerns none  Hearing concerns none  Diet is usually overall healthy, low carb.   Smoker - no  Alcohol use - none  Exercising 7 days per week.   Sexually active - yes, no issues  Colonoscopy NA           Review of Systems   Constitutional:  Negative for appetite change, chills, fatigue and fever.   HENT:  Negative for congestion, hearing loss and sore throat.    Eyes:  Negative for pain, redness and visual disturbance.   Respiratory:  Negative for cough, chest tightness and shortness of breath.    Cardiovascular:  Negative for chest pain and leg swelling.   Gastrointestinal:  Negative for abdominal distention, abdominal pain, blood in stool, constipation and diarrhea.   Genitourinary:  Negative for difficulty urinating and dysuria.   Musculoskeletal:  Negative for arthralgias and back pain.   Skin:  Negative for rash.   Neurological:  Negative for dizziness, weakness and headaches.   Hematological:  Negative for adenopathy. Does not bruise/bleed easily.   Psychiatric/Behavioral:  Negative for dysphoric mood. The patient is not nervous/anxious.        Objective   /86   Pulse 86   Resp 12   Ht 1.981 m (6' 6\")   Wt 107 kg (236 lb)   SpO2 96%   BMI 27.27 kg/m²    Physical Exam  Constitutional:       General: He is not in acute distress.     Appearance: Normal appearance. He is not ill-appearing.   HENT:      Head: Normocephalic and atraumatic.      Right Ear: Tympanic membrane, ear canal and external ear normal.      Left Ear: Tympanic membrane, ear canal and external ear normal.      Nose: Nose normal.      Mouth/Throat:      Mouth: Mucous membranes are moist.      Pharynx: No " oropharyngeal exudate or posterior oropharyngeal erythema.   Eyes:      Extraocular Movements: Extraocular movements intact.      Conjunctiva/sclera: Conjunctivae normal.      Pupils: Pupils are equal, round, and reactive to light.   Neck:      Vascular: No carotid bruit.   Cardiovascular:      Rate and Rhythm: Normal rate and regular rhythm.      Heart sounds: Normal heart sounds. No murmur heard.  Pulmonary:      Breath sounds: Normal breath sounds. No wheezing, rhonchi or rales.   Abdominal:      General: Bowel sounds are normal. There is no distension.      Palpations: Abdomen is soft. There is no mass.      Tenderness: There is no abdominal tenderness.   Genitourinary:     Prostate: Normal.      Rectum: Guaiac result negative.   Musculoskeletal:         General: No swelling or deformity.      Cervical back: Neck supple. No tenderness.   Lymphadenopathy:      Cervical: No cervical adenopathy.   Skin:     General: Skin is warm and dry.      Findings: No lesion or rash.   Neurological:      Mental Status: He is alert and oriented to person, place, and time.      Sensory: No sensory deficit.      Motor: No weakness.      Coordination: Coordination normal.      Deep Tendon Reflexes: Reflexes normal.   Psychiatric:         Mood and Affect: Mood normal.         Behavior: Behavior normal.         Judgment: Judgment normal.           Assessment/Plan   Diagnoses and all orders for this visit:  Routine general medical examination- MMR shot given today, Flu shot recommended, other vaccines current. Rechecking fasting labs. Continue with healthy diet and regular exercise.   Low testosterone in male - doing well on T replacement, HENRIK normal and will recheck HGB, PSA with labs.     Follow up in 1 year, 30min for physical

## 2024-02-21 LAB
ALBUMIN SERPL BCP-MCNC: 4.8 G/DL (ref 3.4–5)
ALP SERPL-CCNC: 46 U/L (ref 33–120)
ALT SERPL W P-5'-P-CCNC: 43 U/L (ref 10–52)
ANION GAP SERPL CALC-SCNC: 12 MMOL/L (ref 10–20)
APPEARANCE UR: CLEAR
AST SERPL W P-5'-P-CCNC: 26 U/L (ref 9–39)
BILIRUB SERPL-MCNC: 1.3 MG/DL (ref 0–1.2)
BILIRUB UR STRIP.AUTO-MCNC: NEGATIVE MG/DL
BUN SERPL-MCNC: 21 MG/DL (ref 6–23)
CALCIUM SERPL-MCNC: 9.6 MG/DL (ref 8.6–10.6)
CHLORIDE SERPL-SCNC: 103 MMOL/L (ref 98–107)
CHOLEST SERPL-MCNC: 171 MG/DL (ref 0–199)
CHOLESTEROL/HDL RATIO: 4.7
CO2 SERPL-SCNC: 28 MMOL/L (ref 21–32)
COLOR UR: ABNORMAL
CREAT SERPL-MCNC: 1.06 MG/DL (ref 0.5–1.3)
EGFRCR SERPLBLD CKD-EPI 2021: >90 ML/MIN/1.73M*2
ERYTHROCYTE [DISTWIDTH] IN BLOOD BY AUTOMATED COUNT: 12.5 % (ref 11.5–14.5)
GLUCOSE SERPL-MCNC: 66 MG/DL (ref 74–99)
GLUCOSE UR STRIP.AUTO-MCNC: NORMAL MG/DL
HCT VFR BLD AUTO: 49 % (ref 41–52)
HCV AB SER QL: NONREACTIVE
HDLC SERPL-MCNC: 36.4 MG/DL
HGB BLD-MCNC: 16.7 G/DL (ref 13.5–17.5)
HOLD SPECIMEN: NORMAL
KETONES UR STRIP.AUTO-MCNC: NEGATIVE MG/DL
LDLC SERPL CALC-MCNC: 123 MG/DL
LEUKOCYTE ESTERASE UR QL STRIP.AUTO: NEGATIVE
MCH RBC QN AUTO: 29.6 PG (ref 26–34)
MCHC RBC AUTO-ENTMCNC: 34.1 G/DL (ref 32–36)
MCV RBC AUTO: 87 FL (ref 80–100)
MUCOUS THREADS #/AREA URNS AUTO: NORMAL /LPF
NITRITE UR QL STRIP.AUTO: NEGATIVE
NON HDL CHOLESTEROL: 135 MG/DL (ref 0–149)
NRBC BLD-RTO: 0 /100 WBCS (ref 0–0)
PH UR STRIP.AUTO: 5.5 [PH]
PLATELET # BLD AUTO: 204 X10*3/UL (ref 150–450)
POTASSIUM SERPL-SCNC: 4.2 MMOL/L (ref 3.5–5.3)
PROT SERPL-MCNC: 7.1 G/DL (ref 6.4–8.2)
PROT UR STRIP.AUTO-MCNC: NEGATIVE MG/DL
PSA SERPL-MCNC: 0.53 NG/ML
RBC # BLD AUTO: 5.64 X10*6/UL (ref 4.5–5.9)
RBC # UR STRIP.AUTO: ABNORMAL /UL
RBC #/AREA URNS AUTO: NORMAL /HPF
SODIUM SERPL-SCNC: 139 MMOL/L (ref 136–145)
SP GR UR STRIP.AUTO: 1.01
TRIGL SERPL-MCNC: 56 MG/DL (ref 0–149)
UROBILINOGEN UR STRIP.AUTO-MCNC: NORMAL MG/DL
VLDL: 11 MG/DL (ref 0–40)
WBC # BLD AUTO: 6.3 X10*3/UL (ref 4.4–11.3)
WBC #/AREA URNS AUTO: NORMAL /HPF

## 2024-02-23 ENCOUNTER — TELEPHONE (OUTPATIENT)
Dept: PRIMARY CARE | Facility: CLINIC | Age: 39
End: 2024-02-23
Payer: COMMERCIAL

## 2024-02-23 NOTE — TELEPHONE ENCOUNTER
----- Message from Tino Vazquez MD sent at 2/21/2024  6:48 AM EST -----  LDL is elevated - recommend low cholesterol diet and regular exercise. Other labs look good.   ----- Message -----  From: Lab, Background User  Sent: 2/21/2024   1:16 AM EST  To: Tino Vazquez MD

## 2024-02-27 ENCOUNTER — HOSPITAL ENCOUNTER (OUTPATIENT)
Dept: RADIOLOGY | Facility: HOSPITAL | Age: 39
Discharge: HOME | End: 2024-02-27
Payer: COMMERCIAL

## 2024-02-27 DIAGNOSIS — M54.16 LUMBAR RADICULOPATHY: ICD-10-CM

## 2024-02-27 PROCEDURE — 72148 MRI LUMBAR SPINE W/O DYE: CPT | Performed by: RADIOLOGY

## 2024-02-27 PROCEDURE — 72148 MRI LUMBAR SPINE W/O DYE: CPT

## 2024-03-19 ENCOUNTER — OFFICE VISIT (OUTPATIENT)
Dept: ORTHOPEDIC SURGERY | Facility: CLINIC | Age: 39
End: 2024-03-19
Payer: COMMERCIAL

## 2024-03-19 VITALS — HEIGHT: 78 IN | WEIGHT: 236 LBS | BODY MASS INDEX: 27.31 KG/M2

## 2024-03-19 DIAGNOSIS — M54.16 LUMBAR RADICULOPATHY: Primary | ICD-10-CM

## 2024-03-19 PROCEDURE — 99214 OFFICE O/P EST MOD 30 MIN: CPT | Performed by: ORTHOPAEDIC SURGERY

## 2024-03-19 PROCEDURE — 1036F TOBACCO NON-USER: CPT | Performed by: ORTHOPAEDIC SURGERY

## 2024-03-19 ASSESSMENT — PAIN - FUNCTIONAL ASSESSMENT: PAIN_FUNCTIONAL_ASSESSMENT: 0-10

## 2024-03-19 ASSESSMENT — PAIN DESCRIPTION - DESCRIPTORS: DESCRIPTORS: DISCOMFORT

## 2024-03-19 NOTE — PROGRESS NOTES
HPI:Diaz Velasco is a pleasant 38-year-old man, who comes in today with complaints of numbness in his left foot.  About a year ago, he strained his back.  He subsequently had terrible radicular pain down the leg for a few weeks which resolved with physical therapy.  He has also had injections.  However he continues to have numbness in the S1 distribution of the foot.  It is constant.  He is exercising and doing activities as tolerated otherwise.  He comes review his MRI.      ROS:  Reviewed on EMR and patient intake sheet.    PMH/SH:  Reviewed on EMR and patient intake sheet.    Exam:  Physical Exam    Constitutional: Well appearing; no acute distress  Eyes: pupils are equal and round  Psych: normal affect  Respiratory: non-labored breathing  Cardiovascular: regular rate and rhythm  GI: non-distended abdomen  Musculoskeletal: no pain with range of motion of the hips bilaterally  Neurologic: [5]/5 strength in the lower extremities bilaterally]; [negative] straight leg raise    Radiology:     MRI demonstrates disc degeneration L4-5 and L5-S1.  He does have a small residual left paracentral L5-S1 disc bulge which produces moderate lateral recess narrowing and some slight compression of the traversing S1 nerve root.    Diagnosis:    Lumbar radiculopathy    Assessment and Plan:   38-year-old man with lumbar radiculopathy secondary to an L5-S1 disc herniation.  At this time he is not having radicular pain.  The numbness and tingling has become constant.  Surgical intervention is not reliable for relief of constant numbness and tingling.  As result, I have encouraged continued nonoperative management.  The patient was in agreement with that plan and will follow-up as necessary.    The patient was in agreement with the plan. At the end of the visit today, the patient felt that all questions had been answered satisfactorily.  The patient was pleased with the visit and very appreciative for the care rendered.     Thank  you very much for the kind referral.  It is a privilege, and a pleasure, to partner with you in the care of your patients.  I would be delighted to assist you with any further consultations as needed.          Dayron Carlisle MD    Chief of Spine Surgery, [facility]  Director of Spine Service, [facility]  , Department of Orthopaedics  [employer] [facility] [employer]  [address] [place] [address]  P: [phone]  [email or web address]    This note was dictated with voice recognition software.  It has not been proofread for grammatical errors, typographical mistakes or other semantic inconsistencies.

## 2025-02-23 DIAGNOSIS — D75.1 ERYTHROCYTOSIS: Primary | ICD-10-CM

## 2025-02-25 ENCOUNTER — APPOINTMENT (OUTPATIENT)
Dept: PRIMARY CARE | Facility: CLINIC | Age: 40
End: 2025-02-25
Payer: COMMERCIAL

## 2025-02-25 VITALS
SYSTOLIC BLOOD PRESSURE: 118 MMHG | HEART RATE: 88 BPM | OXYGEN SATURATION: 96 % | WEIGHT: 238 LBS | RESPIRATION RATE: 12 BRPM | HEIGHT: 78 IN | BODY MASS INDEX: 27.54 KG/M2 | DIASTOLIC BLOOD PRESSURE: 84 MMHG

## 2025-02-25 DIAGNOSIS — D22.9 BENIGN-APPEARING PIGMENTED SKIN LESION: ICD-10-CM

## 2025-02-25 DIAGNOSIS — Z00.00 HEALTHCARE MAINTENANCE: Primary | ICD-10-CM

## 2025-02-25 DIAGNOSIS — M54.16 LUMBAR RADICULITIS: ICD-10-CM

## 2025-02-25 DIAGNOSIS — E29.1 HYPOGONADISM IN MALE: ICD-10-CM

## 2025-02-25 PROCEDURE — 3008F BODY MASS INDEX DOCD: CPT | Performed by: FAMILY MEDICINE

## 2025-02-25 PROCEDURE — 99395 PREV VISIT EST AGE 18-39: CPT | Performed by: FAMILY MEDICINE

## 2025-02-25 PROCEDURE — 1036F TOBACCO NON-USER: CPT | Performed by: FAMILY MEDICINE

## 2025-02-25 RX ORDER — THIAMINE HCL 50 MG
50 TABLET ORAL DAILY
COMMUNITY

## 2025-02-25 RX ORDER — PYRIDOXINE HCL (VITAMIN B6) 100 MG
100 TABLET ORAL DAILY
COMMUNITY

## 2025-02-25 RX ORDER — TESTOSTERONE CYPIONATE 1000 MG/10ML
INJECTION, SOLUTION INTRAMUSCULAR
COMMUNITY

## 2025-02-25 RX ORDER — ALPHA LIPOIC ACID 50 MG
TABLET ORAL
COMMUNITY

## 2025-02-25 RX ORDER — VIT C/E/ZN/COPPR/LUTEIN/ZEAXAN 250MG-90MG
500 CAPSULE ORAL DAILY
COMMUNITY

## 2025-02-25 RX ORDER — CHOLECALCIFEROL (VITAMIN D3) 25 MCG
1000 TABLET ORAL DAILY
COMMUNITY

## 2025-02-25 ASSESSMENT — ENCOUNTER SYMPTOMS
EYE REDNESS: 0
SHORTNESS OF BREATH: 0
BACK PAIN: 1
CHEST TIGHTNESS: 0
DYSPHORIC MOOD: 0
BRUISES/BLEEDS EASILY: 0
WEAKNESS: 0
ABDOMINAL PAIN: 0
DIFFICULTY URINATING: 0
ADENOPATHY: 0
FEVER: 0
DIZZINESS: 0
DIARRHEA: 0
EYE PAIN: 0
SORE THROAT: 0
HEADACHES: 0
ARTHRALGIAS: 0
NERVOUS/ANXIOUS: 0
COUGH: 0
ABDOMINAL DISTENTION: 0
DYSURIA: 0
CONSTIPATION: 0
APPETITE CHANGE: 0
CHILLS: 0
FATIGUE: 0
BLOOD IN STOOL: 0

## 2025-02-25 ASSESSMENT — PATIENT HEALTH QUESTIONNAIRE - PHQ9
SUM OF ALL RESPONSES TO PHQ9 QUESTIONS 1 AND 2: 0
2. FEELING DOWN, DEPRESSED OR HOPELESS: NOT AT ALL
1. LITTLE INTEREST OR PLEASURE IN DOING THINGS: NOT AT ALL

## 2025-02-25 NOTE — PROGRESS NOTES
"Subjective   Patient ID: Diaz Velasco is a 39 y.o. male who presents for Annual Exam.  PMHX, PSHx, Fam hx, and Social hx reviewed.   New concerns none  Vaccines recommend Flu shot, giving info on HPV vaccine  Dentist seen at least yearly yes  Vision concerns  - floater in R eye  Hearing concerns none  Diet is usually overall healthy.   Smoker - no  No MJ/drug use  Lung CT/screening - NA  AAA screening - NA  Alcohol use - dry 6yrs now  Exercising 5-7 days per week.   Sexually active - yes, no issues  Colonoscopy NA  ACP - advance directives, code status, and DPOA documentation discussed    He was diagnosed with low T about 2 years ago and is getting T for wellness clinic. Doing steroid shots twice weekly.           Review of Systems   Constitutional:  Negative for appetite change, chills, fatigue and fever.   HENT:  Negative for congestion, hearing loss and sore throat.    Eyes:  Negative for pain, redness and visual disturbance.   Respiratory:  Negative for cough, chest tightness and shortness of breath.    Cardiovascular:  Negative for chest pain and leg swelling.   Gastrointestinal:  Negative for abdominal distention, abdominal pain, blood in stool, constipation and diarrhea.   Genitourinary:  Negative for difficulty urinating and dysuria.   Musculoskeletal:  Positive for back pain (has seen spine specialist, nonsurgical). Negative for arthralgias.   Skin:  Negative for rash.   Neurological:  Negative for dizziness, weakness and headaches.   Hematological:  Negative for adenopathy. Does not bruise/bleed easily.   Psychiatric/Behavioral:  Negative for dysphoric mood. The patient is not nervous/anxious.        Objective   /84   Pulse 88   Resp 12   Ht 1.981 m (6' 6\")   Wt 108 kg (238 lb)   SpO2 96%   BMI 27.50 kg/m²    Physical Exam  Constitutional:       General: He is not in acute distress.     Appearance: Normal appearance. He is not ill-appearing.   HENT:      Head: Normocephalic and atraumatic. "      Right Ear: Tympanic membrane, ear canal and external ear normal.      Left Ear: Tympanic membrane, ear canal and external ear normal.      Nose: Nose normal.      Mouth/Throat:      Mouth: Mucous membranes are moist.      Pharynx: No oropharyngeal exudate or posterior oropharyngeal erythema.   Eyes:      Extraocular Movements: Extraocular movements intact.      Conjunctiva/sclera: Conjunctivae normal.      Pupils: Pupils are equal, round, and reactive to light.   Neck:      Thyroid: No thyroid mass or thyromegaly.      Vascular: No carotid bruit.   Cardiovascular:      Rate and Rhythm: Normal rate and regular rhythm.      Heart sounds: Normal heart sounds. No murmur heard.  Pulmonary:      Breath sounds: Normal breath sounds. No wheezing, rhonchi or rales.   Abdominal:      General: Bowel sounds are normal. There is no distension.      Palpations: Abdomen is soft. There is no mass.      Tenderness: There is no abdominal tenderness.   Genitourinary:     Prostate: Normal.      Rectum: Guaiac result negative.   Musculoskeletal:         General: No swelling or deformity.      Cervical back: Neck supple. No tenderness.   Lymphadenopathy:      Cervical: No cervical adenopathy.   Skin:     General: Skin is warm and dry.      Findings: No rash.      Comments: Multiple benign appearing nevi on back. B/L upper cheeks have small ~1-2mm raised papules.   Neurological:      Mental Status: He is alert and oriented to person, place, and time.      Sensory: No sensory deficit.      Motor: No weakness.      Coordination: Coordination normal.      Deep Tendon Reflexes: Reflexes normal.   Psychiatric:         Mood and Affect: Mood normal.         Behavior: Behavior normal.         Judgment: Judgment normal.         Assessment/Plan   Diagnoses and all orders for this visit:  Healthcare maintenance - Flu shot recommended, HPV vaccine info given. Other vaccines current. Keep working on healthy diet and regular exercise. Discussed  not over taking Vitamin supplements ( especially D, E and K)  Hypogonadism in male - referring to Dr Link  Benign-appearing pigmented skin lesion - referring to Dermatology  Lumbar radiculitis - stable with exercising, monitor. Could consider PT.    Follow up in 1 year, 30min for physical

## 2025-02-26 LAB
ALBUMIN SERPL-MCNC: 5.2 G/DL (ref 3.6–5.1)
ALP SERPL-CCNC: 45 U/L (ref 36–130)
ALT SERPL-CCNC: 44 U/L (ref 9–46)
ANION GAP SERPL CALCULATED.4IONS-SCNC: 10 MMOL/L (CALC) (ref 7–17)
APPEARANCE UR: CLEAR
AST SERPL-CCNC: 22 U/L (ref 10–40)
BACTERIA #/AREA URNS HPF: NORMAL /HPF
BACTERIA UR CULT: NORMAL
BILIRUB SERPL-MCNC: 1 MG/DL (ref 0.2–1.2)
BILIRUB UR QL STRIP: NEGATIVE
BUN SERPL-MCNC: 24 MG/DL (ref 7–25)
CALCIUM SERPL-MCNC: 9.9 MG/DL (ref 8.6–10.3)
CHLORIDE SERPL-SCNC: 103 MMOL/L (ref 98–110)
CHOLEST SERPL-MCNC: 187 MG/DL
CHOLEST/HDLC SERPL: 4.8 (CALC)
CO2 SERPL-SCNC: 29 MMOL/L (ref 20–32)
COLOR UR: NORMAL
CREAT SERPL-MCNC: 1.15 MG/DL (ref 0.6–1.26)
EGFRCR SERPLBLD CKD-EPI 2021: 83 ML/MIN/1.73M2
ERYTHROCYTE [DISTWIDTH] IN BLOOD BY AUTOMATED COUNT: 12.7 % (ref 11–15)
GLUCOSE SERPL-MCNC: 73 MG/DL (ref 65–99)
GLUCOSE UR QL STRIP: NEGATIVE
HCT VFR BLD AUTO: 54.7 % (ref 38.5–50)
HDLC SERPL-MCNC: 39 MG/DL
HGB BLD-MCNC: 18.3 G/DL (ref 13.2–17.1)
HGB UR QL STRIP: NEGATIVE
HYALINE CASTS #/AREA URNS LPF: NORMAL /LPF
KETONES UR QL STRIP: NEGATIVE
LDLC SERPL CALC-MCNC: 130 MG/DL (CALC)
LEUKOCYTE ESTERASE UR QL STRIP: NEGATIVE
MCH RBC QN AUTO: 30.2 PG (ref 27–33)
MCHC RBC AUTO-ENTMCNC: 33.5 G/DL (ref 32–36)
MCV RBC AUTO: 90.3 FL (ref 80–100)
NITRITE UR QL STRIP: NEGATIVE
NONHDLC SERPL-MCNC: 148 MG/DL (CALC)
PH UR STRIP: 7.5 [PH] (ref 5–8)
PLATELET # BLD AUTO: 197 THOUSAND/UL (ref 140–400)
PMV BLD REES-ECKER: 11.3 FL (ref 7.5–12.5)
POTASSIUM SERPL-SCNC: 4.6 MMOL/L (ref 3.5–5.3)
PROT SERPL-MCNC: 7.6 G/DL (ref 6.1–8.1)
PROT UR QL STRIP: NEGATIVE
PSA SERPL-MCNC: 0.68 NG/ML
RBC # BLD AUTO: 6.06 MILLION/UL (ref 4.2–5.8)
RBC #/AREA URNS HPF: NORMAL /HPF
SERVICE CMNT-IMP: NORMAL
SODIUM SERPL-SCNC: 142 MMOL/L (ref 135–146)
SP GR UR STRIP: 1.03 (ref 1–1.03)
SQUAMOUS #/AREA URNS HPF: NORMAL /HPF
TRIGL SERPL-MCNC: 82 MG/DL
TSH SERPL-ACNC: 4.43 MIU/L (ref 0.4–4.5)
WBC # BLD AUTO: 7.2 THOUSAND/UL (ref 3.8–10.8)
WBC #/AREA URNS HPF: NORMAL /HPF

## 2025-03-26 DIAGNOSIS — D75.1 ERYTHROCYTOSIS: ICD-10-CM

## 2025-07-10 ENCOUNTER — OFFICE VISIT (OUTPATIENT)
Dept: URGENT CARE | Age: 40
End: 2025-07-10
Payer: COMMERCIAL

## 2025-07-10 VITALS
HEART RATE: 90 BPM | OXYGEN SATURATION: 98 % | BODY MASS INDEX: 27.2 KG/M2 | WEIGHT: 235.4 LBS | RESPIRATION RATE: 18 BRPM | TEMPERATURE: 97.9 F

## 2025-07-10 DIAGNOSIS — T15.91XA FOREIGN BODY OF RIGHT EYE, INITIAL ENCOUNTER: Primary | ICD-10-CM

## 2025-07-10 ASSESSMENT — ENCOUNTER SYMPTOMS
CONSTITUTIONAL NEGATIVE: 1
EYE PAIN: 1
EYE REDNESS: 1

## 2025-07-10 ASSESSMENT — PAIN SCALES - GENERAL: PAINLEVEL_OUTOF10: 2

## 2025-07-10 NOTE — PROGRESS NOTES
Subjective   Patient ID: Diaz Velasco is a 40 y.o. male. They present today with a chief complaint of right eye irritation for 2 days.    History of Present Illness  40-year-old male presents to clinic today with complaints of right eye irritation.  Patient states that this been ongoing for about 2 days.  He states there is some discomfort.  He denies blurry double vision.  He states he does wear contacts but has not been wearing them.          Past Medical History  Allergies as of 07/10/2025 - Reviewed 07/10/2025   Allergen Reaction Noted    Hydrocodone-acetaminophen GI Upset 10/24/2014       Prescriptions Prior to Admission[1]     Medical History[2]    Surgical History[3]     reports that he has never smoked. He has never used smokeless tobacco. He reports that he does not currently use alcohol. He reports current drug use. Drug: Marijuana.    Review of Systems  Review of Systems   Constitutional: Negative.    Eyes:  Positive for pain and redness.                                  Objective    Vitals:    07/10/25 0804   Pulse: 90   Resp: 18   Temp: 36.6 °C (97.9 °F)   SpO2: 98%   Weight: 107 kg (235 lb 6.4 oz)     No LMP for male patient.    Physical Exam  Eyes:      Extraocular Movements: Extraocular movements intact.      Conjunctiva/sclera:      Right eye: Right conjunctiva is injected.      Pupils: Pupils are equal, round, and reactive to light.     Neurological:      Mental Status: He is alert.         Procedures    Point of Care Test & Imaging Results from this visit  No results found for this visit on 07/10/25.   Imaging  No results found.    Cardiology, Vascular, and Other Imaging  No other imaging results found for the past 2 days      Diagnostic study results (if any) were reviewed by Christian Bowles PA-C.    Assessment/Plan   Allergies, medications, history, and pertinent labs/EKGs/Imaging reviewed by Christian Bowles PA-C.     Medical Decision Making  Patient pleasant cooperative on  examination.    On examination the right eye is injected.  There is a very small circular dark foreign body to the 6 o'clock position of the pupil.    A small fragment was able to be removed in office however the larger piece is still intact.  Multiple flushes and attempted removals with Q-tips were unsuccessful.    I discussed with patient to contact [facility] eye surgeons for possible same-day or next day appointments.  If unsuccessful he may try the emergency room for further evaluation.    Patient agreement plan.  Patient alert and oriented, no acute distress upon discharge.    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

## 2025-07-14 ENCOUNTER — APPOINTMENT (OUTPATIENT)
Dept: ENDOCRINOLOGY | Facility: CLINIC | Age: 40
End: 2025-07-14
Payer: COMMERCIAL

## 2026-02-26 ENCOUNTER — APPOINTMENT (OUTPATIENT)
Dept: PRIMARY CARE | Facility: CLINIC | Age: 41
End: 2026-02-26
Payer: COMMERCIAL